# Patient Record
Sex: FEMALE | Race: WHITE | Employment: OTHER | URBAN - METROPOLITAN AREA
[De-identification: names, ages, dates, MRNs, and addresses within clinical notes are randomized per-mention and may not be internally consistent; named-entity substitution may affect disease eponyms.]

---

## 2017-01-25 ENCOUNTER — RADIANT APPOINTMENT (OUTPATIENT)
Dept: MAMMOGRAPHY | Facility: CLINIC | Age: 67
End: 2017-01-25
Attending: FAMILY MEDICINE
Payer: COMMERCIAL

## 2017-01-25 DIAGNOSIS — Z12.31 VISIT FOR SCREENING MAMMOGRAM: ICD-10-CM

## 2017-01-25 PROCEDURE — G0202 SCR MAMMO BI INCL CAD: HCPCS | Mod: TC

## 2020-05-14 ENCOUNTER — HOSPITAL ENCOUNTER (EMERGENCY)
Facility: CLINIC | Age: 70
Discharge: HOME | End: 2020-05-14
Attending: EMERGENCY MEDICINE | Admitting: EMERGENCY MEDICINE
Payer: COMMERCIAL

## 2020-05-14 VITALS
TEMPERATURE: 98 F | HEART RATE: 76 BPM | OXYGEN SATURATION: 99 % | DIASTOLIC BLOOD PRESSURE: 104 MMHG | SYSTOLIC BLOOD PRESSURE: 166 MMHG

## 2020-05-14 DIAGNOSIS — S05.02XA ABRASION OF LEFT CORNEA, INITIAL ENCOUNTER: ICD-10-CM

## 2020-05-14 DIAGNOSIS — H11.32 SUBCONJUNCTIVAL HEMORRHAGE OF LEFT EYE: ICD-10-CM

## 2020-05-14 PROCEDURE — 99283 EMERGENCY DEPT VISIT LOW MDM: CPT

## 2020-05-14 RX ORDER — ERYTHROMYCIN 5 MG/G
0.5 OINTMENT OPHTHALMIC 3 TIMES DAILY
Qty: 1 TUBE | Refills: 0 | Status: SHIPPED | OUTPATIENT
Start: 2020-05-14

## 2020-05-14 RX ORDER — TETRACAINE HYDROCHLORIDE 5 MG/ML
SOLUTION OPHTHALMIC
Status: DISCONTINUED
Start: 2020-05-14 | End: 2020-05-15 | Stop reason: HOSPADM

## 2020-05-14 ASSESSMENT — ENCOUNTER SYMPTOMS: EYE PAIN: 1

## 2020-05-14 NOTE — ED AVS SNAPSHOT
[facility] Emergency Department  [address]  [facility] [address]  Phone:  [phone]  Fax:  [phone]                                    Shireen Baldwin   MRN: [id number]    Department:  [facility] Emergency Department   Date of Visit:  5/14/2020           After Visit Summary Signature Page    I have received my discharge instructions, and my questions have been answered. I have discussed any challenges I see with this plan with the nurse or doctor.    ..........................................................................................................................................  Patient/Patient Representative Signature      ..........................................................................................................................................  Patient Representative Print Name and Relationship to Patient    ..................................................               ................................................  Date                                   Time    ..........................................................................................................................................  Reviewed by Signature/Title    ...................................................              ..............................................  Date                                               Time          22EPIC Rev 08/18

## 2020-05-15 NOTE — ED TRIAGE NOTES
Pt in with C/O L eye injury. Pt reports she was struck in the eye with a knife while working in the barn. Pt states that she is unsure if she was struck with the sharp of dull end of the knife. Pt reports no visual changes. Redness present on exam. Pt A&Ox4, ABCD's intact. Pt seen at clinic and sent for further evaluation.

## 2020-05-15 NOTE — ED PROVIDER NOTES
History     Chief Complaint: Eye Problem    HPI   Shireen Baldwin is a 69 year old female who presents with a left eye injury. She was seen for this previously at Urgent [facility] in [place], who encouraged her to come to the ED as their eye clinic was closed at the time. She notes she was in a barn earlier tonight and a metal knife with a plastic handle hit her eye, though it did not feel like a poke. She states her vision is fine at this time, though there was some disturbance after the injury, and she localizes her pain to her eyeball rather than her eyelid. She endorses pain with movement of her eye. She denies pain around the eyeball.     Allergies:  No known drug allergies    Medications:    The patient is not currently taking any prescribed medications.    Past Medical History:    Osteoporosis  Hypertension     Past Surgical History:    History reviewed. No pertinent surgical history.    Family History:    Stroke (father)  Breast cancer (mother)    Social History:  Smoking status: Yes, 3 cigarettes/day  Alcohol use: No  Drug use: No  PCP: [facility] [place] [facility]  Presents to the ED from clinic  Marital Status:  Single [1]    Review of Systems   Eyes: Positive for pain (Left).   All other systems reviewed and are negative.      Physical Exam     Patient Vitals for the past 24 hrs:   BP Temp Temp src Pulse SpO2   05/14/20 2130 -- -- -- -- 99 %   05/14/20 2059 (!) 166/104 98  F (36.7  C) Temporal 76 98 %     Physical Exam  Constitutional: Alert, attentive, GCS 15  HENT:    Nose: Nose normal.    Mouth/Throat: Oropharynx is clear, mucous membranes are moist   Eyes:   Slit Lamp Exam:  - left lateral scleral/conjunctival injection  - subjconjunctival hemorrhage at 4 o'clock left eye  - EOM are normal  - No foreign body with eversion of the lids  - Fluorescein uptake left consistent with abrasion between 4 o'clock and 6 o'clock inferolateral to iris; no Tarsha sign  - No Cell or flare  - No hypopion  or hyphema  CV: normal perfusion  Chest: Effort normal and speaking full sentences  MSK: no facial bone tenderness or swelling  Neurological: Alert, attentive  Skin: Skin is warm and dry.      Emergency Department Course   Procedures: None.    Emergency Department Course:  Past medical records, nursing notes, and vitals reviewed.  2124: I performed an exam of the patient and obtained history, as documented above.    2148: I performed a focused exam of the patient's left eye, as documented above.     2154: I rechecked the patient. Findings and plan explained to the patient. Patient discharged home with instructions regarding supportive care, medications, and reasons to return. The importance of close follow-up was reviewed.       Impression & Plan      Medical Decision Making:  This is a 69-year-old female presents for evaluation of left eye injury as described above.  Differential includes possible subconjunctival hemorrhage, corneal abrasion, globe injury, among others.  Slit-lamp is overall very reassuring with evidence of corneal abrasion and subject conjunctival hemorrhage, but no evidence of globe injury.  Visual acuity is subjectively normal and is 20/25 both eyes.  Plan erythromycin prophylaxis, ophthalmology follow-up in 2 to 3 days if symptoms are not improving, and strict return precautions for any worse vision, worse pain, or any other concerns.    Critical Care time:  none    Diagnosis:    ICD-10-CM    1. Abrasion of left cornea, initial encounter  S05.02XA    2. Subconjunctival hemorrhage of left eye  H11.32        Disposition: discharged to home with Romycin ointment    Discharge Medications:  New Prescriptions    ERYTHROMYCIN (ROMYCIN) 5 MG/GM OPHTHALMIC OINTMENT    Place 0.5 inches into the right eye 3 times daily     Baldo OLIVERA, am serving as a scribe at 9:30 PM on 5/14/2020 to document services personally performed by Alfie Cabrales MD based on my observations and the provider's  statements to me.     Baldo Che  5/14/2020   [facility] EMERGENCY DEPARTMENT       Alfie Cabrales MD  05/15/20 0033